# Patient Record
Sex: MALE | Race: WHITE | NOT HISPANIC OR LATINO | Employment: OTHER | URBAN - METROPOLITAN AREA
[De-identification: names, ages, dates, MRNs, and addresses within clinical notes are randomized per-mention and may not be internally consistent; named-entity substitution may affect disease eponyms.]

---

## 2019-11-29 ENCOUNTER — OFFICE VISIT (OUTPATIENT)
Dept: URGENT CARE | Facility: CLINIC | Age: 34
End: 2019-11-29

## 2019-11-29 VITALS
HEIGHT: 67 IN | SYSTOLIC BLOOD PRESSURE: 140 MMHG | DIASTOLIC BLOOD PRESSURE: 80 MMHG | RESPIRATION RATE: 16 BRPM | BODY MASS INDEX: 24.33 KG/M2 | TEMPERATURE: 98.7 F | WEIGHT: 155 LBS | OXYGEN SATURATION: 100 % | HEART RATE: 86 BPM

## 2019-11-29 DIAGNOSIS — J02.8 ACUTE PHARYNGITIS DUE TO OTHER SPECIFIED ORGANISMS: ICD-10-CM

## 2019-11-29 DIAGNOSIS — J02.9 SORE THROAT: ICD-10-CM

## 2019-11-29 LAB
INT CON NEG: NORMAL
INT CON POS: NORMAL
S PYO AG THROAT QL: NEGATIVE

## 2019-11-29 PROCEDURE — 99203 OFFICE O/P NEW LOW 30 MIN: CPT | Performed by: NURSE PRACTITIONER

## 2019-11-29 PROCEDURE — 87880 STREP A ASSAY W/OPTIC: CPT | Performed by: NURSE PRACTITIONER

## 2019-11-29 RX ORDER — AZITHROMYCIN 250 MG/1
TABLET, FILM COATED ORAL
Qty: 6 TAB | Refills: 0 | Status: SHIPPED | OUTPATIENT
Start: 2019-11-29 | End: 2020-08-01

## 2019-11-29 ASSESSMENT — ENCOUNTER SYMPTOMS
CHILLS: 0
COUGH: 0
SPUTUM PRODUCTION: 1
HEADACHES: 0
SORE THROAT: 1
FEVER: 0
MYALGIAS: 0
EYE DISCHARGE: 0
ORTHOPNEA: 0
DIARRHEA: 0
NAUSEA: 0

## 2019-11-29 NOTE — PROGRESS NOTES
Subjective:      Cash Johnson is a 34 y.o. male who presents with Sore Throat (x1wk, sore throat, mostly on right side, painful to swollow, swelling)            HPI New. 34 year old male with sore throat x 3 days. Denies fever, chills, myalgia, congestion or cough. States more painful on right side with associated swelling. He has no nausea or diarrhea. Taking aleve with good relief.  Patient has no known allergies.  No current outpatient medications on file prior to visit.     No current facility-administered medications on file prior to visit.      Social History     Socioeconomic History   • Marital status: Single     Spouse name: Not on file   • Number of children: Not on file   • Years of education: Not on file   • Highest education level: Not on file   Occupational History   • Not on file   Social Needs   • Financial resource strain: Not on file   • Food insecurity:     Worry: Not on file     Inability: Not on file   • Transportation needs:     Medical: Not on file     Non-medical: Not on file   Tobacco Use   • Smoking status: Never Smoker   • Smokeless tobacco: Never Used   Substance and Sexual Activity   • Alcohol use: Not on file   • Drug use: Not on file   • Sexual activity: Not on file   Lifestyle   • Physical activity:     Days per week: Not on file     Minutes per session: Not on file   • Stress: Not on file   Relationships   • Social connections:     Talks on phone: Not on file     Gets together: Not on file     Attends Orthodox service: Not on file     Active member of club or organization: Not on file     Attends meetings of clubs or organizations: Not on file     Relationship status: Not on file   • Intimate partner violence:     Fear of current or ex partner: Not on file     Emotionally abused: Not on file     Physically abused: Not on file     Forced sexual activity: Not on file   Other Topics Concern   • Not on file   Social History Narrative   • Not on file     Breast Cancer-related  "family history is not on file.      Review of Systems   Constitutional: Positive for malaise/fatigue. Negative for chills and fever.   HENT: Positive for sore throat. Negative for congestion.    Eyes: Negative for discharge.   Respiratory: Positive for sputum production. Negative for cough.    Cardiovascular: Negative for chest pain and orthopnea.   Gastrointestinal: Negative for diarrhea and nausea.   Musculoskeletal: Negative for myalgias.   Neurological: Negative for headaches.   Endo/Heme/Allergies: Negative for environmental allergies.          Objective:     /80 (BP Location: Left arm, Patient Position: Sitting, BP Cuff Size: Adult)   Pulse 86   Temp 37.1 °C (98.7 °F) (Temporal)   Resp 16   Ht 1.689 m (5' 6.5\")   Wt 70.3 kg (155 lb)   SpO2 100%   BMI 24.64 kg/m²      Physical Exam  Nursing note reviewed.   Constitutional:       General: He is not in acute distress.     Appearance: He is well-developed.   HENT:      Head: Normocephalic and atraumatic.      Right Ear: Ear canal and external ear normal. No middle ear effusion. Tympanic membrane is not injected or perforated.      Left Ear: Ear canal and external ear normal.  No middle ear effusion. Tympanic membrane is not injected or perforated.      Nose: Mucosal edema and rhinorrhea present. No congestion.      Mouth/Throat:      Pharynx: Posterior oropharyngeal erythema present. No oropharyngeal exudate.   Eyes:      General:         Right eye: No discharge.         Left eye: No discharge.      Conjunctiva/sclera: Conjunctivae normal.   Neck:      Musculoskeletal: Normal range of motion and neck supple.   Cardiovascular:      Rate and Rhythm: Normal rate and regular rhythm.      Heart sounds: Normal heart sounds. No murmur.   Pulmonary:      Effort: Pulmonary effort is normal. No respiratory distress.      Breath sounds: Normal breath sounds.   Musculoskeletal: Normal range of motion.      Comments: Normal movement of all 4 extremities. "   Lymphadenopathy:      Cervical: No cervical adenopathy.      Upper Body:      Right upper body: No supraclavicular adenopathy.      Left upper body: No supraclavicular adenopathy.   Skin:     General: Skin is warm and dry.   Neurological:      Mental Status: He is alert and oriented to person, place, and time.      Gait: Gait normal.   Psychiatric:         Behavior: Behavior normal.         Thought Content: Thought content normal.                 Assessment/Plan:     1. Acute pharyngitis due to other specified organisms  azithromycin (ZITHROMAX) 250 MG Tab   2. Sore throat  POCT Rapid Strep A     Strep negative.  z-pack as he has symptoms consistent with infection  Differential diagnosis, natural history, supportive care, and indications for immediate follow-up discussed at length.

## 2020-08-01 ENCOUNTER — HOSPITAL ENCOUNTER (EMERGENCY)
Facility: MEDICAL CENTER | Age: 35
End: 2020-08-01
Attending: EMERGENCY MEDICINE
Payer: COMMERCIAL

## 2020-08-01 VITALS
RESPIRATION RATE: 20 BRPM | TEMPERATURE: 98.9 F | HEIGHT: 66 IN | BODY MASS INDEX: 26.93 KG/M2 | WEIGHT: 167.55 LBS | HEART RATE: 94 BPM | DIASTOLIC BLOOD PRESSURE: 80 MMHG | OXYGEN SATURATION: 96 % | SYSTOLIC BLOOD PRESSURE: 130 MMHG

## 2020-08-01 DIAGNOSIS — R00.2 PALPITATIONS: ICD-10-CM

## 2020-08-01 LAB — EKG IMPRESSION: NORMAL

## 2020-08-01 PROCEDURE — 93005 ELECTROCARDIOGRAM TRACING: CPT | Mod: EDC | Performed by: EMERGENCY MEDICINE

## 2020-08-01 PROCEDURE — 93005 ELECTROCARDIOGRAM TRACING: CPT

## 2020-08-01 PROCEDURE — 99283 EMERGENCY DEPT VISIT LOW MDM: CPT | Mod: EDC

## 2020-08-02 NOTE — DISCHARGE INSTRUCTIONS
You were seen in the emergency department for a fast heart rate.  Your physical exam, vital signs, and EKG are all reassuring.  At this time, the cause of your symptoms is not clear but does not appear to be dangerous.    It is important that you follow-up with a primary care physician.    If you continue to have episodes of fast heart rate, you will likely have to wear a heart monitor to determine the cause of these episodes.    Please return to the emergency department or seek medical attention if you develop:  Chest pain, shortness of breath, fever, leg swelling, ongoing fast heart rate, any other new or concerning findings    ================================  Coronavirus Information    Your visit did NOT appear to relate to coronavirus, but if you or your family develop symptoms that concern you for coronavirus (please see CDC website for symptoms), please contact the [facility] hotline (or your local health department)  or your healthcare provider before going to a medical facility:    [facility]  24hr hotline: [phone]    Information is available from the Centers for Disease Control and Prevention  www.CDC.gov    and     [facility]  [email or web address]    If you are severely ill or having a hard time breathing, please immediatly seek medical care. Notify the  or Emergency Department Triage about your symptoms.

## 2020-08-02 NOTE — ED PROVIDER NOTES
ED Provider Note    Scribed for Gino Painting M.D. by Rush Baugh. 8/1/2020,  10:13 PM.    Means of Arrival: Walk In  History obtained from: Patient  History limited by: None    CHIEF COMPLAINT  Chief Complaint   Patient presents with   • Rapid Heart Beat     started x 30 min ago while sitting watching TV, sudden onset, denies SOB or CP       HPI  Cash Johnson is a 34 y.o. male who presents to the Emergency Department for evaluation of an elevated heart rate. Patient states that 30 minutes ago while sitting down and watching TV his heart suddenly increased from the 60's up to 187. He denies having any chest pain or shortness of breath with this problem, and notes that since onset his heart rate has been slowly declining, and at this time is at 95 BPM. Patient notes that took an edible with 5 mg of THC tonight, but otherwise denies any other substance use. He further denies any weight changes, fevers, chills, nausea, vomiting, leg swelling, or diaphoresis.    REVIEW OF SYSTEMS  CONSTITUTIONAL: Negative: Fevers, chills, weight changes  CARDIOVASCULAR:  Positive: Tachycardia; Negative: Chest pain, leg swelling  RESPIRATORY: Negative: Shortness of breath  GASTROINTESTINAL: Negative: Nausea, vomiting  SKIN: Negative: Diaphoresis  See HPI for further details.   All other systems are negative.     PAST MEDICAL HISTORY  History reviewed. No pertinent past medical history.    FAMILY HISTORY  History reviewed. No pertinent family history.    SOCIAL HISTORY   reports that he has never smoked. He has never used smokeless tobacco. He reports current alcohol use. He reports current drug use.    SURGICAL HISTORY  History reviewed. No pertinent surgical history.    CURRENT MEDICATIONS  Home Medications     Reviewed by Mis Chong R.N. (Registered Nurse) on 08/01/20 at 2140  Med List Status: Complete   Medication Last Dose Status        Patient Eliazar Taking any Medications                       ALLERGIES  No  "Known Allergies    PHYSICAL EXAM  VITAL SIGNS: /88   Pulse (!) 119   Temp 36 °C (96.8 °F) (Temporal)   Resp 17   Ht 1.676 m (5' 6\")   Wt 76 kg (167 lb 8.8 oz)   SpO2 98%   BMI 27.04 kg/m²    Gen: Alert, no acute distress  HEENT: ATNC  Eyes: PERRL, EOMI, normal conjunctiva.   Neck: trachea midline  Resp: no respiratory distress, lungs clear  CV: No JVD, RRR  Abd: non-distended  Ext: No deformities, No pedal edema  Psych: normal mood  Neuro: speech fluent     DIAGNOSTIC STUDIES / PROCEDURES     EKG  Results for orders placed or performed during the hospital encounter of 20   EKG   Result Value Ref Range    Report       [facility] Emergency Dept.    Test Date:  2020  Pt Name:    RICK YEUNG               Department: ER  MRN:        [id number]                      Room:  Gender:     Male                         Technician: [id number]  :        1985                   Requested By:ER TRIAGE PROTOCOL  Order #:    [id number]                    Reading MD: Gino Painting    Measurements  Intervals                                Axis  Rate:       98                           P:          43  MN:         156                          QRS:        32  QRSD:       102                          T:          11  QT:         352  QTc:        450    Interpretive Statements  SINUS RHYTHM  RSR' IN V1 OR V2, RIGHT VCD OR RVH  No previous ECG available for comparison  Electronically Signed On 2020 22:33:30 PDT by Gino Painting          COURSE & MEDICAL DECISION MAKING  Pertinent Labs & Imaging studies reviewed. (See chart for details)    10:13 PM Patient seen and examined at bedside. Ordered for EKG to evaluate. Discussed with the patient that on his exam there are no signs of infection, thyroid dysfunction, abnormal heart rhythms or other obvious causes of his elevated heart rate. Further his EKG is reassuring and otherwise his physical exam is overall normal. At this time I do not feel that any " specific tests here in the ED would be of use, however I will refer him to setup with a primary care provider to further evaluate this problem in a long term setting. Answered any questions or concerns the patient had, he is given return precautions, and is comfortable with discharge and the follow up plan of care.    Medical Decision Making:  Patient resents with palpitations, no evidence of chest pain.  No evidence of thyroid dysfunction, symptomatic anemia, sepsis, no high risk findings on EKG.  Patient is otherwise reassuring.  No evidence of toxidrome.  No evidence of DVT/PE.  Patient has normal vital signs in the emergency department.  This could be secondary to his edible marijuana use, however etiology is unclear.  ED  was contacted to arrange for primary care follow-up.    The patient will return for new or worsening symptoms and is stable at the time of discharge.    DISPOSITION:  Patient will be discharged home in stable condition.    FOLLOW UP:  To establish a primary care provider within our system, please call [phone]             FINAL IMPRESSION  1. Palpitations           Rush MONTANEZ (Kristineibrich), am scribing for, and in the presence of, Gino Painting M.D..    Electronically signed by: Rush Baugh (Dacia), 8/1/2020    Gino MONTANEZ M.D. personally performed the services described in this documentation, as scribed by Rush Baugh in my presence, and it is both accurate and complete. C.    The note accurately reflects work and decisions made by me.  Gino Painting M.D.  8/2/2020  3:31 AM

## 2020-08-02 NOTE — ED NOTES
Cash Johnson D/C'd.  Discharge instructions including s/s to return to ED, follow up appointments, hydration importance and palpitations provided to pt.    Parents verbalized understanding with no further questions and concerns.    Copy of discharge provided to pt.  Signed copy in chart.    Pt walked out of department; pt in NAD, awake, alert, interactive and age appropriate.

## 2020-08-02 NOTE — ED NOTES
Pt walked to peds 40. Gown provided. Call light introduced. All questions and concerns addressed. Chart up for ERP.

## 2020-08-02 NOTE — ED TRIAGE NOTES
"Cash Johnson     Chief Complaint   Patient presents with   • Rapid Heart Beat     started x 30 min ago while sitting watching TV, sudden onset, denies SOB or CP     Patient walked into triage for above complaint. Patient denies any medical hx or rx medications.     Admits to having a marijuana edible 5mg tonight, but states this is normal for him and he has never had a reaction like this before. Denies any other drug or alcohol use tonight.   EKG performed, mask in place, back out to lobby.    Blood Pressure: 148/88, Pulse: (!) 119, Respiration: 17, Temperature: 36 °C (96.8 °F), Height: 167.6 cm (5' 6\"), Weight: 76 kg (167 lb 8.8 oz), BMI (Calculated): 27.04, BSA (Calculated): 1.9, Pulse Oximetry: 98 %     "

## 2022-01-31 PROBLEM — M25.531 RIGHT WRIST PAIN: Status: ACTIVE | Noted: 2022-01-31

## 2022-01-31 PROBLEM — M25.831 ULNAR IMPACTION SYNDROME, RIGHT: Status: ACTIVE | Noted: 2022-01-31

## 2022-11-30 ENCOUNTER — HOSPITAL ENCOUNTER (OUTPATIENT)
Dept: RADIOLOGY | Facility: MEDICAL CENTER | Age: 37
End: 2022-11-30
Attending: FAMILY MEDICINE
Payer: COMMERCIAL

## 2022-11-30 DIAGNOSIS — R74.8 ELEVATED LIVER ENZYMES: ICD-10-CM

## 2022-11-30 PROCEDURE — 76700 US EXAM ABDOM COMPLETE: CPT

## 2024-03-13 ENCOUNTER — OFFICE VISIT (OUTPATIENT)
Dept: URGENT CARE | Facility: CLINIC | Age: 39
End: 2024-03-13
Payer: COMMERCIAL

## 2024-03-13 VITALS
RESPIRATION RATE: 16 BRPM | TEMPERATURE: 97.8 F | OXYGEN SATURATION: 97 % | DIASTOLIC BLOOD PRESSURE: 70 MMHG | WEIGHT: 180 LBS | HEIGHT: 67 IN | SYSTOLIC BLOOD PRESSURE: 118 MMHG | HEART RATE: 68 BPM | BODY MASS INDEX: 28.25 KG/M2

## 2024-03-13 DIAGNOSIS — J02.9 PHARYNGITIS, UNSPECIFIED ETIOLOGY: ICD-10-CM

## 2024-03-13 PROCEDURE — 99203 OFFICE O/P NEW LOW 30 MIN: CPT | Performed by: NURSE PRACTITIONER

## 2024-03-13 PROCEDURE — 3074F SYST BP LT 130 MM HG: CPT | Performed by: NURSE PRACTITIONER

## 2024-03-13 PROCEDURE — 3078F DIAST BP <80 MM HG: CPT | Performed by: NURSE PRACTITIONER

## 2024-03-13 RX ORDER — ALLOPURINOL 300 MG/1
300 TABLET ORAL DAILY
COMMUNITY
Start: 2023-12-29

## 2024-03-13 RX ORDER — DEXAMETHASONE SODIUM PHOSPHATE 10 MG/ML
10 INJECTION INTRAMUSCULAR; INTRAVENOUS ONCE
Status: COMPLETED | OUTPATIENT
Start: 2024-03-13 | End: 2024-03-13

## 2024-03-13 RX ORDER — AMOXICILLIN 500 MG/1
500 CAPSULE ORAL 2 TIMES DAILY
Qty: 20 CAPSULE | Refills: 0 | Status: SHIPPED | OUTPATIENT
Start: 2024-03-13 | End: 2024-03-23

## 2024-03-13 RX ADMIN — DEXAMETHASONE SODIUM PHOSPHATE 10 MG: 10 INJECTION INTRAMUSCULAR; INTRAVENOUS at 09:58

## 2024-03-13 NOTE — PROGRESS NOTES
Date: 03/13/24        Chief Complaint   Patient presents with    Pharyngitis     Sx x 2 weeks         History of Present Illness: 38 y.o. male  presents to clinic with 2-week history of sore throat with painful swallowing.  Patient states some days are worse than others although his lymph nodes to his neck continued to swell intermittently causing continued pain.  He reports painful swallowing that does wake him up at night.  He denies any significant congestion or cough.  No ear pain.  He denies any rashes fevers or bodyaches.  No nausea vomiting diarrhea.  He has been using ibuprofen and other OTC medications with no significant alleviation.      ROS:  No severe shortness of breath   No Cardiac like chest pain, as discussed   As otherwise stated in HPI    Medical/SX/ Social History:  Reviewed per chart    Medications:    Current Outpatient Medications on File Prior to Visit   Medication Sig Dispense Refill    allopurinol (ZYLOPRIM) 300 MG Tab Take 300 mg by mouth every day.      meloxicam (MOBIC) 15 MG tablet Take 1 Tablet by mouth every day. 30 Tablet 0     No current facility-administered medications on file prior to visit.        Allergies:    Patient has no known allergies.     Problem list, medications, and allergies reviewed by myself today in Epic       Physical Exam:  Vitals:    03/13/24 0945   BP: 118/70   Pulse: 68   Resp: 16   Temp: 36.6 °C (97.8 °F)   SpO2: 97%        Physical Exam  Constitutional:       General: He is awake.      Appearance: Normal appearance. He is not ill-appearing, toxic-appearing or diaphoretic.   HENT:      Head: Normocephalic and atraumatic.      Right Ear: Tympanic membrane, ear canal and external ear normal.      Left Ear: Tympanic membrane, ear canal and external ear normal.      Nose: Rhinorrhea present. Rhinorrhea is clear.      Mouth/Throat:      Lips: Pink.      Mouth: Mucous membranes are moist.      Tongue: No lesions.      Palate: No lesions.      Pharynx: Pharyngeal  swelling and posterior oropharyngeal erythema present. No oropharyngeal exudate or uvula swelling.      Tonsils: No tonsillar exudate or tonsillar abscesses. 3+ on the right. 2+ on the left.      Comments: PND   Eyes:      General: Lids are normal. Gaze aligned appropriately. No allergic shiner or scleral icterus.     Extraocular Movements: Extraocular movements intact.      Conjunctiva/sclera: Conjunctivae normal.      Pupils: Pupils are equal, round, and reactive to light.   Cardiovascular:      Rate and Rhythm: Normal rate and regular rhythm.      Pulses:           Radial pulses are 2+ on the right side and 2+ on the left side.      Heart sounds: Normal heart sounds.   Pulmonary:      Effort: Pulmonary effort is normal.      Breath sounds: Normal breath sounds and air entry. No decreased breath sounds, wheezing, rhonchi or rales.   Abdominal:      General: Abdomen is flat. Bowel sounds are normal.      Palpations: Abdomen is soft.      Tenderness: There is no abdominal tenderness.   Musculoskeletal:      Right lower leg: No edema.      Left lower leg: No edema.   Lymphadenopathy:      Cervical: Cervical adenopathy present.      Right cervical: Superficial cervical adenopathy present.      Left cervical: Superficial cervical adenopathy present.   Skin:     General: Skin is warm.      Capillary Refill: Capillary refill takes less than 2 seconds.      Coloration: Skin is not cyanotic or pale.   Neurological:      Mental Status: He is alert and oriented to person, place, and time.      Gait: Gait is intact.   Psychiatric:         Behavior: Behavior normal. Behavior is cooperative.          Medical Decision Making / Plan :  I personally reviewed prior external notes and test results pertinent to today's visit. Pt is clinically stable at today's acute urgent care visit.  Patient appears nontoxic with no acute distress noted. Appropriate for outpatient care at this time. The patient remained stable during the urgent  care visit.     Pleasant 38 y.o. male  presented clinic with 2-week history of sore throat.  On exam patient has erythematous swelling to pharynx.  There is postnasal drip as well.  Do suspect likely bacterial etiology due to continued cervical lymphadenopathy and the longevity of symptoms.  Did discuss testing patient did reasonably declined.  We will start patient on antibiotics.  Did give in clinic Decadron due to OTC medications not alleviating the sore throat.    1. Pharyngitis, unspecified etiology    - dexamethasone (Decadron) injection (check route below) 10 mg  - amoxicillin (AMOXIL) 500 MG Cap; Take 1 Capsule by mouth 2 times a day for 10 days.  Dispense: 20 Capsule; Refill: 0       Medication discussed included indication for use, the potential benefits, side effects and risks. Education was provided regarding the aforementioned assessments.  All of the patient's questions were answered to their satisfaction at the time of discharge. Patient was encouraged to monitor symptoms closely. Those signs and symptoms which would warrant concern and mandate seeking a higher level of service through the emergency department discussed at length.  Patient stated agreement and understanding of this plan of care.        Disposition:  Patient was discharged in stable condition.    Voice Recognition Disclaimer: Portions of this document were created using voice recognition software. The software does have a chance of producing errors of grammar and possibly content. I have made every reasonable attempt to correct obvious errors, but there may be errors of grammar and possibly content that I did not discover before finalizing the documentation.    DEBORAH Reeves.